# Patient Record
(demographics unavailable — no encounter records)

---

## 2024-11-02 NOTE — PHYSICAL EXAM
[Well Nourished] : well nourished [PERRL] : pupils equal round and reactive to light [EOMI] : extraocular movements intact [Normal Outer Ear/Nose] : the outer ears and nose were normal in appearance [Normal Oropharynx] : the oropharynx was normal [No Lymphadenopathy] : no lymphadenopathy [No Respiratory Distress] : no respiratory distress  [Clear to Auscultation] : lungs were clear to auscultation bilaterally [Normal Rate] : normal rate  [Normal S1, S2] : normal S1 and S2 [Soft] : abdomen soft [Non Tender] : non-tender [Non-distended] : non-distended [Normal Supraclavicular Nodes] : no supraclavicular lymphadenopathy [Normal Posterior Cervical Nodes] : no posterior cervical lymphadenopathy [Normal Anterior Cervical Nodes] : no anterior cervical lymphadenopathy [No CVA Tenderness] : no CVA  tenderness [No Spinal Tenderness] : no spinal tenderness [Grossly Normal Strength/Tone] : grossly normal strength/tone [No Rash] : no rash [Normal Affect] : the affect was normal [Normal Insight/Judgement] : insight and judgment were intact [de-identified] : Smile was symmetric, nasolabial fold preserved bilaterally, Eyebrow raise symmetric bilaterally, Sensation was less on the right side of the face along the VI V2 V3 distribution

## 2024-11-02 NOTE — PLAN
[FreeTextEntry1] : - Appears to have classic presentation for trigeminal neuralgia - MR head to r/o Secondary causes  - May initiate Carbamazepine for symptomatic treatment - Will bring patient back to discuss results and talk about therapy  - Pt needs bloodwork at the next visit - Pt should also f/u with neurology d/t a history of TBI  - However, for now will work up trigeminal neuralgia since that is of acute concern for the patient   RTC 1 week s/p MRI   Case d/w Dr. Ohara

## 2024-11-02 NOTE — REVIEW OF SYSTEMS
[Negative] : Psychiatric [de-identified] : Right sided facial pain and numbness. Pain described as electric shock like sensation

## 2024-11-02 NOTE — HISTORY OF PRESENT ILLNESS
[FreeTextEntry8] : 54-year-old male with past medical history of traumatic brain injury presenting today for an evaluation of right sided facial electric shock like sensations as well as numbness every now and then which has been ongoing for the past 4 months. This is a new patient. As per chart review, patient had a left sided subdural hemorrhage, was admitted for subdural drain placement and evacuation of hematoma in 2022. As per discharge notes, it appears that patient was discharged on Keppra 2 times a day, folic acid and thiamine, as well as multivitamin. Patient is presenting as a new patient today and stating that he is not taking any medications at this time. His acute concern for today is that for the past four months he has been having right sided sharp facial pain that almost feels like electric shocks together with facial numbness on the right side. patient denied any other numbness or weakness of his upper body or lower body. Denied fevers or chills.

## 2024-11-02 NOTE — REVIEW OF SYSTEMS
[Negative] : Psychiatric [de-identified] : Right sided facial pain and numbness. Pain described as electric shock like sensation

## 2024-11-02 NOTE — PHYSICAL EXAM
[Well Nourished] : well nourished [PERRL] : pupils equal round and reactive to light [EOMI] : extraocular movements intact [Normal Outer Ear/Nose] : the outer ears and nose were normal in appearance [Normal Oropharynx] : the oropharynx was normal [No Lymphadenopathy] : no lymphadenopathy [No Respiratory Distress] : no respiratory distress  [Clear to Auscultation] : lungs were clear to auscultation bilaterally [Normal Rate] : normal rate  [Normal S1, S2] : normal S1 and S2 [Soft] : abdomen soft [Non Tender] : non-tender [Non-distended] : non-distended [Normal Supraclavicular Nodes] : no supraclavicular lymphadenopathy [Normal Posterior Cervical Nodes] : no posterior cervical lymphadenopathy [Normal Anterior Cervical Nodes] : no anterior cervical lymphadenopathy [No CVA Tenderness] : no CVA  tenderness [No Spinal Tenderness] : no spinal tenderness [Grossly Normal Strength/Tone] : grossly normal strength/tone [No Rash] : no rash [Normal Affect] : the affect was normal [Normal Insight/Judgement] : insight and judgment were intact [de-identified] : Smile was symmetric, nasolabial fold preserved bilaterally, Eyebrow raise symmetric bilaterally, Sensation was less on the right side of the face along the VI V2 V3 distribution

## 2025-03-03 NOTE — HISTORY OF PRESENT ILLNESS
[de-identified] : 55 y/o M with a PMHx of alcohol disorder, L sided subdural hemorrhage, subdural drain placement and evacuation of hematoma in 2022, trigeminal neuralgia presents today for post-hospital discharge f/u regarding R sided facial pain. Pt currently denies any headache, dizziness, fever, chills, sob, chest palpitations, chest pain, or numbness/tingling.

## 2025-03-03 NOTE — PHYSICAL EXAM
[Normal] : soft, non-tender, non-distended, no masses palpated, no HSM and normal bowel sounds [Limited Balance] : balance was intact [Romberg's Sign] : Romberg's sign was negtive [Past-pointing] : there was no past-pointing [Dysdiadochokinesia Bilaterally] : not present [Coordination - Dysmetria Impaired Finger-to-Nose Bilateral] : not present [Coordination - Dysmetria Impaired Heel-to-Shin Bilateral] : not present

## 2025-06-28 NOTE — HISTORY OF PRESENT ILLNESS
[FreeTextEntry1] : CSP [de-identified] : 53 y/o M with a PMHx of alcohol disorder, L sided subdural hemorrhage, subdural drain placement and evacuation of hematoma in 2022, trigeminal neuralgia presents for [facility]SP colon cancer screening.  Denies family hx of colon cancer/ breast cancer/ cervical cancer or any cancers.********* FIT: Denies abnormal colonoscopies, blood in stool, unintentional weight loss, or stool changes.